# Patient Record
Sex: FEMALE | Race: BLACK OR AFRICAN AMERICAN | NOT HISPANIC OR LATINO | Employment: UNEMPLOYED | URBAN - METROPOLITAN AREA
[De-identification: names, ages, dates, MRNs, and addresses within clinical notes are randomized per-mention and may not be internally consistent; named-entity substitution may affect disease eponyms.]

---

## 2023-07-13 ENCOUNTER — PATIENT OUTREACH (OUTPATIENT)
Dept: ADMINISTRATIVE | Facility: OTHER | Age: 48
End: 2023-07-13

## 2023-07-13 PROBLEM — G47.00 INSOMNIA: Status: ACTIVE | Noted: 2023-07-13

## 2023-07-13 PROBLEM — F32.A DEPRESSION: Status: ACTIVE | Noted: 2023-07-13

## 2023-07-13 NOTE — PROGRESS NOTES
CHW - Initial Contact    This Community Health Worker completed the Social Determinant of Health questionnaire with patient during clinic visit today.    Pt identified barriers of most importance are: patient identified  barriers of housing, work and food. Patient recently lost her employment  However she receivers snaps and bill assistance. Was given information about housing thru community resources. Will revisit   Referrals to community agencies completed with patient consent outside of [facility] include: Community referral given for housing.   Referrals were put through [facility] - no:   Support and Services: support services were offered.  Other information discussed the patient needs  help with: patient discussed no other information during clinic visit    Follow up required: Yes  Follow-up Outreach - Due: 7/27/2023

## 2023-07-28 ENCOUNTER — PATIENT OUTREACH (OUTPATIENT)
Dept: ADMINISTRATIVE | Facility: OTHER | Age: 48
End: 2023-07-28

## 2023-07-28 NOTE — PROGRESS NOTES
CHW - Follow Up    This Community Health Worker completed a follow up visit with patient via telephone today.  Pt reported: patient reported she is okay she still has some health concerns will address with provider.   Community Health Worker provided: CHW spoke with patient she is okay . Will revisit 07/31/2023  Follow up required: Yes  Follow-up Outreach - Due: 7/31/2023

## 2023-08-01 ENCOUNTER — PATIENT OUTREACH (OUTPATIENT)
Dept: ADMINISTRATIVE | Facility: OTHER | Age: 48
End: 2023-08-01

## 2023-08-01 NOTE — PROGRESS NOTES
CHW - Outreach Attempt    Community Health Worker left a voicemail message for 1st attempt to contact patient regarding: initial screening SDOH regarding available resources   Community Health Worker to attempt to contact patient on: 08/01/2023.

## 2023-08-31 ENCOUNTER — PATIENT OUTREACH (OUTPATIENT)
Dept: ADMINISTRATIVE | Facility: OTHER | Age: 48
End: 2023-08-31

## 2023-08-31 NOTE — PROGRESS NOTES
CHW - Follow Up    This Community Health Worker completed a follow up visit with patient via telephone today.  Pt reported: patient reported she is still having personal issues regarding several areas in her life. She was  Unable to attend job interview. Will discuss with provider in regards to what is happening.  Community Health Worker provided: CHW spoke with patient she is still having personal issues.   Follow up required: Yes  Follow-up Outreach - Due: 9/14/2023

## 2023-09-18 ENCOUNTER — PATIENT OUTREACH (OUTPATIENT)
Dept: ADMINISTRATIVE | Facility: OTHER | Age: 48
End: 2023-09-18

## 2023-09-18 NOTE — PROGRESS NOTES
CHW - Follow Up    This Community Health Worker completed a follow up visit with patient via telephone today.  Pt reported: patient reported she is maintaining she has some challenges but she is working though them   Will revisit   Community Health Worker provided: CHW spoke with patient she is maintaning.   Follow up required: Yes  Follow-up Outreach - Due: 10/2/2023

## 2023-09-28 ENCOUNTER — PATIENT OUTREACH (OUTPATIENT)
Dept: ADMINISTRATIVE | Facility: OTHER | Age: 48
End: 2023-09-28

## 2023-09-28 NOTE — PROGRESS NOTES
CHW - Follow Up    This Community Health Worker completed a follow up visit with patient via telephone today.  Pt  reported:  patient reported she is still having personal issues regarding several areas in her life. She is  Maintaining and is still working with provider. Will discuss with provider in regards to what is happening.  Community Health Worker provided: CHW spoke with patient she is still having personal issues.   Community Health Worker provided: CHW  Follow up required:Yes   Follow-up Outreach - Due: 10/19/2023

## 2023-10-23 ENCOUNTER — PATIENT OUTREACH (OUTPATIENT)
Dept: ADMINISTRATIVE | Facility: OTHER | Age: 48
End: 2023-10-23

## 2023-10-23 NOTE — PROGRESS NOTES
CHW - Follow Up    This Community Health Worker completed a follow up visit with patient via telephone today.  Pt reported: patient still working thought some personal issues with provider. But she states she is much better.  Patient is still seeking employment. However housing is no longer a current concern.  Community Health Worker provided: CHW spoke with patient she is much better.   Follow up required: Yes  Follow-up Outreach - Due: 11/6/2023

## 2023-11-10 ENCOUNTER — PATIENT OUTREACH (OUTPATIENT)
Dept: ADMINISTRATIVE | Facility: OTHER | Age: 48
End: 2023-11-10

## 2023-11-10 NOTE — PROGRESS NOTES
CHW - Follow Up    This Community Health Worker completed a follow up visit with patient via telephone today.  Pt reported: Patient reported she is still have personal  issues and medication changes   Will address with provider on next appointment.   Community Health Worker provided: CHW spoke with patient she is okay  Follow up required: Yes  Follow-up Outreach - Due: 11/21/2023

## 2023-11-28 ENCOUNTER — PATIENT OUTREACH (OUTPATIENT)
Dept: ADMINISTRATIVE | Facility: OTHER | Age: 48
End: 2023-11-28

## 2023-11-28 NOTE — PROGRESS NOTES
CHW - Follow Up    This Community Health Worker completed a follow up visit with patient via telephone today.  Pt reported: patient reported she is still have a lot of personal issues will address with provider on next   Appointment visit   Community Health Worker provided: CHW spoke with patient she is still have a lot of personal issues   Follow up required: Yes  Follow-up Outreach - Due: 12/12/2023

## 2023-12-18 ENCOUNTER — PATIENT OUTREACH (OUTPATIENT)
Dept: ADMINISTRATIVE | Facility: OTHER | Age: 48
End: 2023-12-18

## 2023-12-18 NOTE — PROGRESS NOTES
CHW - Outreach Attempt    Community Health Worker left a voicemail message for 1st attempt to contact patient regarding: SDOH follow up  phone interview.   Community Health Worker to attempt to contact patient on: 12/18/2023.

## 2024-02-02 ENCOUNTER — PATIENT OUTREACH (OUTPATIENT)
Dept: ADMINISTRATIVE | Facility: OTHER | Age: 49
End: 2024-02-02

## 2024-02-02 NOTE — PROGRESS NOTES
CHW - Follow Up    This Community Health Worker completed a follow up visit with patient via telephone today.  Pt reported: patient reported she is doing alright still had some challenges but is better. Patient will discussed   What resources she is needing when she comes in for are appointment. Date and time.   Community Health Worker provided: CHW spoke with patient she is doing alright  Follow up required: Yes  Follow-up Outreach - Due: 2/9/2024

## 2024-03-04 ENCOUNTER — PATIENT OUTREACH (OUTPATIENT)
Dept: ADMINISTRATIVE | Facility: OTHER | Age: 49
End: 2024-03-04

## 2024-03-04 NOTE — PROGRESS NOTES
CHW - Follow Up    This Community Health Worker completed a follow up visit with patient via telephone today.  Pt reported: patient reported she is maintaining still has some life issues but no further assistance is needed during follow up   Phone interview. Patient will reach out if any assistance is needed in the future.   Community Health Worker provided: CHW spoke with patient she is maintaining   Follow up required:No   No future outreach task assigned                 CHW - Case Closure    This Community Health Worker spoke to patient via telephone today.   Pt reported: patient reported she is maintaining still has some life issues but no further assistance is needed during follow up   Phone interview. Patient will reach out if any assistance is needed in the future.   Pt denied any additional needs at this time and agrees with episode closure at this time.    Provided patient with Community Health Worker's contact information and encouraged   her to contact this Community Health Worker if additional needs arise.

## 2024-09-27 DIAGNOSIS — Z12.31 OTHER SCREENING MAMMOGRAM: ICD-10-CM
